# Patient Record
(demographics unavailable — no encounter records)

---

## 2024-10-24 NOTE — HISTORY OF PRESENT ILLNESS
[FreeTextEntry1] : 75 yo  female presents c/o vaginal dryness and irritation and dyspareunnia. Has tried several methods HRT vs nonHRT options. Last tried HyaloGYN and currently using lubes for intercourse.   Denies urinary symptoms or vaginal discharge

## 2024-10-24 NOTE — PLAN
[FreeTextEntry1] : A/P: 75 yo  female with vaginal atrophy - rx sent for Intrarosa - pt does not like the idea of an insert - aquaphor daily - sample given of Uberlube  f/u for annual and prn

## 2025-01-27 NOTE — HISTORY OF PRESENT ILLNESS
[FreeTextEntry1] : Pt is a 74 year  PM female presenting today for annual exam.   Pt with continued complaints of vaginal dryness; "feels like shards of glass down there" during sexual intercourse. Pt is currently using aquaphor which has helped her symptoms but has stopped all other forms of estrogen cream. Pt was last seen in 2024--Intrarosa was sent at that time but pt states the medication was too expensive so she never used it. Pt has tried estring in the past and states it made her bleed & she does not want to try it again. Denies breast pain, abnormal nipple discharge, abdominal pain, abnormal uterine bleeding, vaginal discharge, dysuria. Pt is sexually active with her . Pt feels safe & supported in her relationship Pt reports regular exercise on her treadmill.  PHQ9: 0   OB: NSVDx2 Gyn: denies abnormal pap smears, fibroids, endometriosis, ovarian cysts, STIs PMH: osteoporosis, HTN, HLD PSH: cataract surgery FMHx: denies family h/o breast, ovarian, uterine, or colon cancer Meds: losartan, provacol, preservision, prolia 2x annually, vitamin D Allx: NKDA Social: social alcohol use, denies tobacco use, denies drug use [TextBox_19] : BIRADS 2 [Mammogramdate] : 01/24 [BreastSonogramDate] : 01/24 [TextBox_25] : BIRADS 2 [TextBox_31] : NILM/HPv neg [PapSmeardate] : 2022 [BoneDensityDate] : 10/24 [TextBox_37] : osteoporosis

## 2025-01-27 NOTE — PLAN
[FreeTextEntry1] : #Well person exam -Medical/surgical/family history reviewed -Allergies and medications reconciled -Reviewed breast awareness/calcium/vitamin D/weight bearing exercise -Mammogram + breast sono already scheduled, pt to send over results -Colonoscopy UTD, due next year -Dexa UTD  #vaginal dryness -rx for estradiol suppositories & estradiol cream sent to pts pharmacy -instructed pt to use both daily for 2 weeks then the suppositories 2-3x/wk & continue the external cream daily -pt instructed to continue use of aquaphor PRN  All questions/concerns of pt addressed to their satisfaction.   F/u in 1 yr or sooner PRN.

## 2025-01-27 NOTE — PHYSICAL EXAM
[Appropriately responsive] : appropriately responsive [Alert] : alert [No Acute Distress] : no acute distress [Soft] : soft [Non-tender] : non-tender [Non-distended] : non-distended [No Lesions] : no lesions [No Mass] : no mass [Oriented x3] : oriented x3 [Examination Of The Breasts] : a normal appearance [No Masses] : no breast masses were palpable [Vulvar Atrophy] : vulvar atrophy [Labia Majora] : normal [Labia Minora] : normal [Atrophy] : atrophy [No Bleeding] : There was no active vaginal bleeding [Normal] : normal [Uterine Adnexae] : normal [Tenderness] : nontender [No Tenderness] : no tenderness [Nl Sphincter Tone] : normal sphincter tone [FreeTextEntry9] : guaiac neg